# Patient Record
Sex: FEMALE | URBAN - METROPOLITAN AREA
[De-identification: names, ages, dates, MRNs, and addresses within clinical notes are randomized per-mention and may not be internally consistent; named-entity substitution may affect disease eponyms.]

---

## 2022-11-23 ENCOUNTER — OUT OF OFFICE VISIT (OUTPATIENT)
Dept: URBAN - METROPOLITAN AREA MEDICAL CENTER 28 | Facility: MEDICAL CENTER | Age: 67
End: 2022-11-23

## 2022-11-23 ENCOUNTER — CLAIMS CREATED FROM THE CLAIM WINDOW (OUTPATIENT)
Dept: URBAN - METROPOLITAN AREA MEDICAL CENTER 28 | Facility: MEDICAL CENTER | Age: 67
End: 2022-11-23
Payer: MEDICARE

## 2022-11-23 DIAGNOSIS — D64.89 ANEMIA DUE TO OTHER CAUSE: ICD-10-CM

## 2022-11-23 DIAGNOSIS — R93.3 ABN FINDINGS-GI TRACT: ICD-10-CM

## 2022-11-23 DIAGNOSIS — K92.1 ACUTE MELENA: ICD-10-CM

## 2022-11-23 DIAGNOSIS — K74.60 ADVANCED CIRRHOSIS: ICD-10-CM

## 2022-11-23 DIAGNOSIS — K29.60 ADENOPAPILLOMATOSIS GASTRICA: ICD-10-CM

## 2022-11-23 DIAGNOSIS — K70.30 ALC (ALCOHOLIC LIVER CIRRHOSIS): ICD-10-CM

## 2022-11-23 DIAGNOSIS — Z79.01 ANTICOAGULANT LONG-TERM USE: ICD-10-CM

## 2022-11-23 DIAGNOSIS — K31.89 ACQUIRED DEFORMITY OF DUODENUM: ICD-10-CM

## 2022-11-23 PROCEDURE — G8427 DOCREV CUR MEDS BY ELIG CLIN: HCPCS | Performed by: PHYSICIAN ASSISTANT

## 2022-11-23 PROCEDURE — 43239 EGD BIOPSY SINGLE/MULTIPLE: CPT | Performed by: INTERNAL MEDICINE

## 2022-11-23 PROCEDURE — 99222 1ST HOSP IP/OBS MODERATE 55: CPT | Performed by: PHYSICIAN ASSISTANT

## 2022-11-24 ENCOUNTER — OUT OF OFFICE VISIT (OUTPATIENT)
Dept: URBAN - METROPOLITAN AREA MEDICAL CENTER 28 | Facility: MEDICAL CENTER | Age: 67
End: 2022-11-24

## 2022-11-24 ENCOUNTER — CLAIMS CREATED FROM THE CLAIM WINDOW (OUTPATIENT)
Dept: URBAN - METROPOLITAN AREA MEDICAL CENTER 28 | Facility: MEDICAL CENTER | Age: 67
End: 2022-11-24
Payer: MEDICARE

## 2022-11-24 DIAGNOSIS — B19.20 DECOMPENSATED CIRRHOSIS RELATED TO HEPATITIS C VIRUS (HCV): ICD-10-CM

## 2022-11-24 DIAGNOSIS — K74.69 CIRRHOSIS, CRYPTOGENIC: ICD-10-CM

## 2022-11-24 DIAGNOSIS — K76.6 CLINICALLY SIGNIFICANT PORTAL HYPERTENSION: ICD-10-CM

## 2022-11-24 PROCEDURE — 99232 SBSQ HOSP IP/OBS MODERATE 35: CPT | Performed by: INTERNAL MEDICINE

## 2022-11-26 ENCOUNTER — TELEPHONE ENCOUNTER (OUTPATIENT)
Dept: URBAN - METROPOLITAN AREA CLINIC 92 | Facility: CLINIC | Age: 67
End: 2022-11-26

## 2024-04-14 ENCOUNTER — LAB (OUTPATIENT)
Dept: URBAN - METROPOLITAN AREA MEDICAL CENTER 39 | Facility: MEDICAL CENTER | Age: 69
End: 2024-04-14
Payer: MEDICARE

## 2024-04-14 DIAGNOSIS — K74.60 CIRRHOSIS: ICD-10-CM

## 2024-04-14 DIAGNOSIS — K92.0 HEMATEMESIS: ICD-10-CM

## 2024-04-14 DIAGNOSIS — K92.1 MELENA: ICD-10-CM

## 2024-04-14 PROCEDURE — G8427 DOCREV CUR MEDS BY ELIG CLIN: HCPCS | Performed by: INTERNAL MEDICINE

## 2024-04-14 PROCEDURE — 99254 IP/OBS CNSLTJ NEW/EST MOD 60: CPT | Performed by: INTERNAL MEDICINE

## 2024-04-14 PROCEDURE — 99222 1ST HOSP IP/OBS MODERATE 55: CPT | Performed by: INTERNAL MEDICINE

## 2024-04-15 ENCOUNTER — LAB (OUTPATIENT)
Dept: URBAN - METROPOLITAN AREA MEDICAL CENTER 39 | Facility: MEDICAL CENTER | Age: 69
End: 2024-04-15
Payer: MEDICARE

## 2024-04-15 DIAGNOSIS — K29.50 CHRONIC GASTRITIS: ICD-10-CM

## 2024-04-15 DIAGNOSIS — I86.4 GASTRIC VARICES: ICD-10-CM

## 2024-04-15 DIAGNOSIS — K92.0 COFFEE GROUND EMESIS: ICD-10-CM

## 2024-04-15 DIAGNOSIS — T18.198A OTH FOREIGN OBJECT IN ESOPHAGUS CAUSING OTH INJURY, INIT: ICD-10-CM

## 2024-04-15 PROCEDURE — 43239 EGD BIOPSY SINGLE/MULTIPLE: CPT | Performed by: INTERNAL MEDICINE

## 2024-04-15 PROCEDURE — 43235 EGD DIAGNOSTIC BRUSH WASH: CPT | Performed by: INTERNAL MEDICINE

## 2024-04-16 ENCOUNTER — LAB (OUTPATIENT)
Dept: URBAN - METROPOLITAN AREA MEDICAL CENTER 39 | Facility: MEDICAL CENTER | Age: 69
End: 2024-04-16
Payer: MEDICARE

## 2024-04-16 DIAGNOSIS — B18.2 CHRONIC HEPATITIS C: ICD-10-CM

## 2024-04-16 DIAGNOSIS — K92.0 COFFEE GROUND EMESIS: ICD-10-CM

## 2024-04-16 DIAGNOSIS — K74.60 CIRRHOSIS: ICD-10-CM

## 2024-04-16 DIAGNOSIS — K92.1 MELENA: ICD-10-CM

## 2024-04-16 PROBLEM — 19943007: Status: ACTIVE | Noted: 2024-04-16

## 2024-04-16 PROCEDURE — 99232 SBSQ HOSP IP/OBS MODERATE 35: CPT | Performed by: INTERNAL MEDICINE

## 2024-04-24 ENCOUNTER — OV EP (OUTPATIENT)
Dept: URBAN - METROPOLITAN AREA CLINIC 98 | Facility: CLINIC | Age: 69
End: 2024-04-24

## 2024-04-24 NOTE — HPI-TODAY'S VISIT:
Here to follow up after [facility] stay  has cirrhosis w HCV (active- naive to treatment) ; active polysubstance use (cocaine, alcohol)  admitted w coffee ground emesis in settin g of chronic Eliquis for Afib  EGD 4/15Findings:      Small (< 5 mm) varices were found in the lower third of the esophagus.      Food vs pill was found in the middle third of the esophagus without       underlying stricture or stenosis, cleared with passage of scope.      Type 2 isolated gastric varices (IGV2, varices located in the body,       antrum or around the pylorus) with no bleeding were found in the gastric       fundus. There were no stigmata of recent bleeding.      Diffuse moderate inflammation characterized by erythema was found in the       stomach. Biopsies were taken with a cold forceps for histology. PATH w active gastritis, no HP      Estimated blood loss was minimal.      The examined duodenum was normal.

## 2024-04-24 NOTE — HPI-OTHER HISTORIES
Latest Reference Range & Units 04/16/24 05:55 Sodium 136 - 145 mmol/L136 - 145 mmol/L [id number] Potassium 3.5 - 5.1 mmol/L3.5 - 5.1 mmol/L 4.04.0 Chloride 98 - 107 mmol/L98 - 107 mmol/L 111 (H)111 (H) Carbon Dioxide 23 - 29 mmol/L23 - 29 mmol/L 21 (L)21 (L) ANION GAP 2 - 11 mmol/L2 - 11 mmol/L 77 Calculated Osmolality 275 - 295 mOsm/kg275 - 295 mOsm/kg [id number] Blood Urea Nitrogen 7 - 25 mg/dL7 - 25 mg/dL 41 (H)41 (H) Creatinine 0.60 - 1.20 mg/dL0.60 - 1.20 mg/dL 1.53 (H)1.53 (H) U:C 7 - 21 7 - 21  27 (H)27 (H) Estimated GFR >=60 mL/min/1.73m2>=60 mL/min/1.73m2 34 (L)34 (L) Glucose 70 - 105 mg/dL70 - 105 mg/dL 112 (H)112 (H) Alkaline Phosphatase 34 - 104 unit/L 76 Aspartate Aminotransferase 13 - 39 unit/L 73 (H) Alanine Aminotransferase 7 - 52 unit/L 45 Calcium Level Total 8.6 - 10.3 mg/dL8.6 - 10.3 mg/dL 8.3 (L)8.3 (L) (H): Data is abnormally high  (L): Data is abnormally low  Latest Reference Range & Units04/16/24 05:55Phosphorus Level2.5 - 5.0 mg/dL5.0Protein Total6.4 - 8.9 gm/dL6.6Fxialdb9.5 - 5.7 gm/dL3.5 - 5.7 gm/dL2.8 (L)2.8 (L)Bilirubin Total0.3 - 1.0 mg/dL0.5 (L): Data is abnormally low  Latest Reference Range & Units04/16/24 05:55White Blood Cell4.0 - 10.0 10E3/mcL2.5 (L)Red Blood Cell3.93 - 5.22 10E6/mcL3.11 (L)Klvakzqjcm57.4 - 14.4 gm/dL8.9 (L)Oujarzrfem65.3 - 41.4 %28.5 (L)Mean Corpuscular Dmpruo57.4 - 94.8 fL91.6Mean Corpuscular Vykohadlsc37.6 - 32.2 pg28.6Mean Corpuscular Hemoglobin Msuyylasrtffa34.0 - 36.0 gm/dL31.2Vzmlvntt117 - 400 10E3/mcL67 (L)MPV9.4 - 12.3 fL11.3Red Cell Distribution Width Coefficient of [place].11.7 - 14.4 %20.7 (H)RDW35.1 - 43.9 fL69.3 (H) (L): Data is abnormally low(H): Data is abnormally high  \*Unknown; Narrative & Impression EXAM: US ABDOMEN LIMITED \~ CLINICAL INDICATION: Elevated liver tests.  \~ TECHNIQUE: Axial and longitudinal images were obtained of the right upper abdomen using real-time ultrasound. [id number] \~ COMPARISON: CT abdomen and pelvis 2/20/2022; right upper quadrant ultrasound 2/18/2020. \~ FINDINGS:  \~ Liver: Redemonstrated morphologic changes of chronic liver disease with diffusely coarsened hepatic echotexture and nodular surface contour. No suspicious focal lesion. Portal vein is patent. Hepatic  veins are patent. \~ Bile Ducts: No dilated intrahepatic biliary radicles. Common bile duct measures 3 mm. \~ Gallbladder: Normal. Castro's sign is negative. \~ Pancreas: Normal where visualized. \~ Right Kidney: Measures 9.5 cm. Normal echogenicity. No hydronephrosis.  \~ Other: None. \~ IMPRESSION: Redemonstrated morphologic changes of chronic liver disease. Patent main portal vein with hepatopetal flow. No suspicious focal lesion. (1) Other Medications/None